# Patient Record
Sex: MALE | Race: WHITE
[De-identification: names, ages, dates, MRNs, and addresses within clinical notes are randomized per-mention and may not be internally consistent; named-entity substitution may affect disease eponyms.]

---

## 2022-11-12 ENCOUNTER — NURSE TRIAGE (OUTPATIENT)
Dept: OTHER | Facility: CLINIC | Age: 57
End: 2022-11-12

## 2023-01-23 ENCOUNTER — NURSE TRIAGE (OUTPATIENT)
Dept: OTHER | Facility: CLINIC | Age: 58
End: 2023-01-23

## 2023-01-23 NOTE — TELEPHONE ENCOUNTER
Location of patient: [place]    Subjective: Caller states \"In October I had some antibiotics given to me. They hurt my stomach and gave me diarrhea, so I stopped taking them then. I have been having diarrhea since then. \"     Current Symptoms: Diarrhea after antibiotics - 3-4 x a day    Onset: 5 months ago; sudden    Associated Symptoms: diarrhea    Pain Severity: 0/10; N/A; none    Temperature: caller denies by unknown method    What has been tried: antidiarrheal medication    LMP: NA Pregnant: NA    Recommended disposition: See in Office Today    Care advice provided, patient verbalizes understanding; denies any other questions or concerns; instructed to call back for any new or worsening symptoms. Patient/caller agrees to follow-up with PCP     This triage is a result of a call to [address] [place] [address]. Please do not respond to the triage nurse through this encounter. Any subsequent communication should be directly with the patient.         Reason for Disposition   MODERATE diarrhea (e.g., 4-6 times / day more than normal) and present > 48 hours (2 days)    Protocols used: Diarrhea-ADULT-[place]